# Patient Record
(demographics unavailable — no encounter records)

---

## 2024-10-14 NOTE — DISCUSSION/SUMMARY
[de-identified] : The patient sustained an injury to his left knee.  He may have had a patella subluxation.  He may have a lateral meniscal tear.  I have discussed the pathology, natural history and treatment options with him.  He is referred for an MRI.  He has a brace which she will use for comfort.  He may bear weight as tolerated.  He has crutches which she will use if he is unable to bear weight.  He will be reevaluated following the MRI.

## 2024-10-14 NOTE — PHYSICAL EXAM
[Slightly Antalgic] : slightly antalgic [LE] : Sensory: Intact in bilateral lower extremities [DP] : dorsalis pedis 2+ and symmetric bilaterally [PT] : posterior tibial 2+ and symmetric bilaterally [Normal] : Alert and in no acute distress [Poor Appearance] : well-appearing [Acute Distress] : not in acute distress [Obese] : not obese [de-identified] : The patient has no respiratory distress. Mood and affect are normal. The patient is alert and oriented to person, place and time. There is no pain with active or passive motion of the hips.  There is no tenderness of either hip.  Examination of the knees demonstrates mild swelling of the left knee.  He has active quadriceps and hamstring function.  He has pain with attempted extension of his knee.  Range of motion 15 to 80 degrees.  There is lateral joint line tenderness.  Rolly test is positive laterally.  There is tenderness with palpation of the patella.  The patella tracks well.  Calves are soft and nontender.  The skin is intact.  There is no lymphedema. [de-identified] : AP, lateral, tunnel and sunrise x-rays of the left knee taken today demonstrate no fracture, no dislocation and no bony abnormality.  There may be a small ossific density posterior to the patella

## 2024-10-14 NOTE — HISTORY OF PRESENT ILLNESS
[de-identified] : 27-year-old male presents for evaluation of left knee injury that occurred on Friday. He felt his knee buckle after he stepped on a branch and felt something pop. He went to urgent care and had x-rays which he brings report of. He complains of constant pain in the knee worse with flexion, extension and weightbearing. He has been wearing a knee brace and using crutches for ambulation. He reports a history of left knee patella dislocations (2013, 2014) treated with PT.

## 2024-10-22 NOTE — PHYSICAL EXAM
[Slightly Antalgic] : slightly antalgic [LE] : Sensory: Intact in bilateral lower extremities [DP] : dorsalis pedis 2+ and symmetric bilaterally [PT] : posterior tibial 2+ and symmetric bilaterally [Normal] : Alert and in no acute distress [Poor Appearance] : well-appearing [Acute Distress] : not in acute distress [Obese] : not obese [de-identified] : The patient has no respiratory distress. Mood and affect are normal. The patient is alert and oriented to person, place and time. There is no pain with active or passive motion of the hips.  There is no tenderness of either hip.  Examination of the knees demonstrates mild swelling of the left knee.  He has active quadriceps and hamstring function.  He has pain with attempted extension of his knee.  Range of motion 15 to 80 degrees.  There is lateral joint line tenderness.  Rolly test is positive laterally.  There is tenderness with palpation of the patella. Calves are soft and nontender.  The skin is intact.  There is no lymphedema. [de-identified] :  EXAM: [id number] - MR KNEE LT  - ORDERED BY: NATIVIDAD STEVENS PROCEDURE DATE:  10/16/2024 INTERPRETATION:  MR KNEE LEFT  HISTORY: Pain. Evaluate for lateral meniscal tear patellar subluxation. Injured on 10/11/2024.  TECHNIQUE:  Multiplanar MRI of the left knee was performed without contrast.  COMPARISON:  None.  FINDINGS:  OSSEOUS STRUCTURES Fractures/Contusions: There are mild contusions involving the medial patellar facet and lateral trochlea suggesting recent patellar dislocation. There is mild subchondral irregularity and impaction/osteochondral lesion involving the inner weightbearing lateral tibial plateau with subchondral reactive changes and overlying chondromalacia, age is indeterminate.  LIGAMENTS AND TENDONS Anterior Cruciate Ligament:  Intact. Posterior Cruciate Ligament:  Intact. Distal Quadriceps Tendon:  Intact. Patellar Tendon:  Intact. Patellar Retinaculum: Intact. Medial Collateral Ligament:  Intact. Posterolateral Corner Structures:  Intact. Iliotibial Band:  Unremarkable.  MEDIAL COMPARTMENT Medial Meniscus:  Intact. Articular Cartilage:  Preserved.  LATERAL COMPARTMENT Lateral Meniscus:  Intact. Articular Cartilage:  Heterogeneous signal changes involve the inner weightbearing lateral tibial plateau cartilage along the subchondral irregularity/depression where there is slight cystic and reactive marrow edema. Cartilage fissure may be present along the lateral margin of the osteochondral lesion.  PATELLOFEMORAL COMPARTMENT Articular Cartilage:  1.7 cm full-thickness cartilage defect involves the median ridge of the patella inferiorly with several fragments displaced into the lateral suprapatellar recess. Patellar Alignment:  There is approximately 1 cm of patellar lateralization. Superior margin of the trochlea is flattened that may reflect a degree of dysplasia. Tibial tubercle to trochlear groove distance is borderline measuring approximately 1.6 cm.  JOINT CAPSULE Effusion/Synovitis:  Small effusion is present with mild synovial thickening. Intra-articular Bodies:  Several cartilaginous bodies are noted within the lateral suprapatellar recess measuring up to 0.7 cm.  PERIARTICULAR SOFT TISSUES Periarticular Cysts:  Small complex Baker's cyst is present with suggestion of synovial debris or hemorrhage. Fat Pad: There is mild edema within Hoffa's fat pad. Musculature:  Preserved. Neurovascular Structures:  Preserved. Subcutaneous Tissues:  Unremarkable.  IMPRESSION: 1.  Mild contusions of the medial tibial plateau and lateral trochlea suggest recent patellar dislocation. 2.  Patellar retinaculum appears intact. 3.  There is approximately 1.7 cm full-thickness cartilage defect of the median ridge of the patella with cartilage fragments displaced into the lateral suprapatellar recess. 4.  Osteochondral lesion of the inner weightbearing lateral femoral condyle may be acute or chronic.  --- End of Report ---   RAHEEM PALMA MD; Attending Radiologist This document has been electronically signed. Oct 21 2024 12:01PM

## 2024-10-22 NOTE — DISCUSSION/SUMMARY
[de-identified] : The patient has had a patella subluxation/dislocation event.  There are contusions of the medial patellar facet and lateral trochlea.  There is evidence of osteochondral lesion with full-thickness nests cartilage defect of the patella.  I am not sure whether this cartilage defect can be repaired.  I would like second opinion from one of my partners to see if patellar realignment or cartilage restoration procedure would be appropriate.  In the interim he is placed in a patella brace and referred for physical therapy.

## 2024-10-22 NOTE — HISTORY OF PRESENT ILLNESS
[de-identified] : The patient presents for reevaluation of left knee. He is here for MRI review.  He is having less pain but still is experiencing swelling in his left knee.

## 2024-10-28 NOTE — DISCUSSION/SUMMARY
[de-identified] : 27-year-old male with left knee patellar instability/chondral defect/loose bodies  Patient presents for evaluation of an acute on chronic condition to the left knee consistent with instability.  Patient has evidence of trochlear dysplasia as well as loss of medial restraint with evidence of instability.  Recent episode has caused chondral injury through the medial patella facets with fragmented chondral loss.  Concerned that these chondral fragments are not acutely repairable given small size.  However, given large size of defect, patient may be a candidate for chondral resurfacing procedure including possible juvenile cartilage, Cartiform, TRACIE.  Consideration also for removal of loose bodies within the knee joint and medial patellofemoral ligament reconstruction.  May also consider surgical arthroscopy and removal of loose body without chondral resurfacing, but I have concerns regarding long-term stability of the left patellofemoral joint.  All risks, benefits and alternatives to the proposed surgical procedure, left knee arthroscopy with loose body retrieval for possible ORIF versus chondral resurfacing procedure, as well as the need for formal post-operative rehabilitation were discussed in great detail with the patient. Risks include but are not limited to pain, bleeding, infection, neurovascular injury, stiffness, medical complications (including DVT, PE, MI), and risks of anesthesia.   The patient expressed understanding and all questions were answered. The patient will discuss options with his family, and will contact the office if he would like to proceed.

## 2024-10-28 NOTE — PHYSICAL EXAM
[de-identified] : Left knee exam  Skin: Clean, dry, intact Inspection: No obvious malalignment, no masses, mild swelling, moderate effusion Pulses: 2+ DP/PT pulses ROM: 5-95 degrees of flexion. +pain with deep knee flexion/extension. Tenderness: +MJLT. +LJLT. +pain over the patella facets. Pain to medial retinaculum Stability: Stable to varus, valgus. Negative lachman testing. Negative anterior drawer, negative posterior drawer. Strength: 5/5 Q/H/TA/GS/EHL, without atrophy Neuro: In tact to light touch throughout, DTR's normal Additional tests: Negative McMurrays test, +patellar grind test.   [de-identified] : Images were reviewed from [facility] dated 10.14.24.   Multiple views of the left knee showed no evidence of bony injury, or antolin dislocation. There is no underlying degenerative arthritic change seen.  Evidence of possible osteochondral injury/loose body.  No patella amrita.  Trochlear dysplasia.  MRI left knee dated 10.16.24 shows evidence of findings consistent with recent patella dislocation.  Impaction injury with 1.7cm chondral injury through the medial patella with displaced fragments within the lateral patellar recess.  TT-TG 1.6 cm.

## 2024-10-28 NOTE — HISTORY OF PRESENT ILLNESS
[de-identified] : 27 year old male presents today with left knee patella instability x 16 days. Patient initially dislocated his patella descending down stairs at age 15. Patient was walking on uneven ground on a golf coarse felt a pop and his knee buckled. Patella dislocated on its own, seen by Dr. Ramsay, MRI was obtained showing osteochondral lesion with full-thickness nests cartilage defect of the patella. He has been referred for PT which he has not started yet. The pain is constant Patient has had 3 patella dislocations in total. The pain is brought on with full extension and flexion. He is not taking pain medication.